# Patient Record
Sex: FEMALE | URBAN - METROPOLITAN AREA
[De-identification: names, ages, dates, MRNs, and addresses within clinical notes are randomized per-mention and may not be internally consistent; named-entity substitution may affect disease eponyms.]

---

## 2021-04-08 DIAGNOSIS — Z23 ENCOUNTER FOR IMMUNIZATION: ICD-10-CM

## 2023-02-10 ENCOUNTER — TELEPHONE (OUTPATIENT)
Dept: DERMATOLOGY | Facility: CLINIC | Age: 63
End: 2023-02-10

## 2023-03-27 ENCOUNTER — OFFICE VISIT (OUTPATIENT)
Dept: DERMATOLOGY | Facility: CLINIC | Age: 63
End: 2023-03-27

## 2023-03-27 VITALS — BODY MASS INDEX: 32 KG/M2 | HEIGHT: 60 IN | WEIGHT: 163 LBS

## 2023-03-27 DIAGNOSIS — L66.9 SCARRING ALOPECIA: Primary | ICD-10-CM

## 2023-03-27 RX ORDER — IBUPROFEN 200 MG
CAPSULE ORAL
COMMUNITY

## 2023-03-27 RX ORDER — MELOXICAM 15 MG/1
15 TABLET ORAL AS NEEDED
COMMUNITY
Start: 2022-12-28

## 2023-03-27 RX ORDER — MEDROXYPROGESTERONE ACETATE 150 MG/ML
INJECTION, SUSPENSION INTRAMUSCULAR
COMMUNITY
Start: 2023-03-07

## 2023-03-27 RX ORDER — FOLIC ACID 1 MG/1
TABLET ORAL
COMMUNITY

## 2023-03-27 RX ORDER — ATORVASTATIN CALCIUM 10 MG/1
TABLET, FILM COATED ORAL
COMMUNITY
Start: 2023-02-11

## 2023-03-27 NOTE — PROGRESS NOTES
"[facility]     Patient Name: Belgica Ambrocio  Encounter Date: 3/27/23     Have you been cared for by a [place] [address] Dermatologist in the last 3 years and, if so, which description applies to you? NO  I am considered a \"new\" patient and must complete all patient intake questions  I am FEMALE/of child-bearing potential     REVIEW OF SYSTEMS:  Have you recently had or currently have any of the following? · Recent fever or chills? No  · Any non-healing wound? No  · Are you pregnant or planning to become pregnant? No  · Are you currently or planning to be nursing or breast feeding? No   PAST MEDICAL HISTORY:  Have you personally ever had or currently have any of the following? If \"YES,\" then please provide more detail  · Skin cancer (such as Melanoma, Basal Cell Carcinoma, Squamous Cell Carcinoma? No  · Tuberculosis, HIV/AIDS, Hepatitis B or C: No  · Systemic Immunosuppression such as Diabetes, Biologic or Immunotherapy, Chemotherapy, Organ Transplantation, Bone Marrow Transplantation YES, on Enbrel for RA  · Radiation Treatment No   FAMILY HISTORY:  Any \"first degree relatives\" (parent, brother, sister, or child) with the following? • Skin Cancer, Pancreatic or Other Cancer? No   PATIENT EXPERIENCE:    • Do you want the Dermatologist to perform a COMPLETE skin exam today including a clinical examination under the \"bra and underwear\" areas? NO  • If necessary, do we have your permission to call and leave a detailed message on your Preferred Phone number that includes your specific medical information?   Yes      Allergies   Allergen Reactions   • Gluten Meal - Food Allergy GI Intolerance   • Penicillins Hives     Other reaction(s): Hives        Current Outpatient Medications:   •  atorvastatin (LIPITOR) 10 mg tablet, ONE AT DINNERTIME MEAL FOR LIPIDS, Disp: , Rfl:   •  calcium carbonate (OS-RJ) 1250 (500 Ca) MG tablet, NO SIG SUPPLIED, Disp: , Rfl:   •  Enbrel SureClick 50 MG/ML injection, , " Disp: , Rfl:   •  folic acid (FOLVITE) 1 mg tablet, Take by mouth, Disp: , Rfl:   •  meloxicam (MOBIC) 15 mg tablet, 15 mg if needed, Disp: , Rfl:   •  methotrexate 2 5 mg tablet, 2 5 mg 3 pills once weekly, Disp: , Rfl:           • Whom besides the patient is providing clinical information about today's encounter?   o NO ADDITIONAL HISTORIAN (patient alone provided history)    Physical Exam and Assessment/Plan by Diagnosis:    SCARRING ALOPECIA     Physical Exam:  • Anatomic Location Affected:  scalp  o Morphological Description:  2 spots with decreased to absent hair density scarring with loss of follicular ostia, perifollicular scale with background erythema  • Pertinent Positives:  • Pertinent Negatives: Additional History of Present Condition:  Patient states the hair loss started about 6 months ago in the front of the scalp  The area can sometimes be painful  Assessment and Plan:  Based on a thorough discussion of this condition and the management approach to it (including a comprehensive discussion of the known risks, side effects and potential benefits of treatment), the patient (family) agrees to implement the following specific plan:    • Biopsy done today  • Consent obtained    What is scarring alopecia? Scarring alopecia, also known as cicatricial alopecia, occurs when the hair follicle is the target of a destructive inflammatory process  Cicatricial alopecia occurs in otherwise healthy men and women of all ages and is seen worldwide  Primary scarring alopecia is due to a diverse group of rare disorders that destroy the hair follicle and replace it with scar tissue causing permanent hair loss  Secondary scarring alopecias are due to destruction of the hair follicle due to external injury such as severe infections, burns, radiation, traction (tight curls), surgery, and other processes     Infections causing scarring alopecia include:  • Bacterial infection: boils and abscesses (Staphylococcus aureus)  • Fungal infection: kerion (inflammatory tinea capitis)  • Viral infection: shingles (herpes zoster)  Inflammatory skin diseases causing scarring alopecia include:  • Folliculitis decalvans  • Dissecting cellulitis  • Lichen planopilaris  • Frontal fibrosing alopecia  • Alopecia mucinosa  • Discoid lupus erythematosus  • Localised scleroderma  Central Centrifugal Cicatricial Alopecia (CCCA) is a form of scarring alopecia on the scalp that results in permanent hair loss  It is the most common form of scarring hair loss seen in black women  However, it may be seen in men and among persons of all races and hair colour (though rarely)  Middle-aged women are most commonly affected  The exact cause of CCCA is unknown and is likely multifactorial  Hair care practices, such as the use of the hot comb, relaxers, tight extensions and weaves, have been implicated for decades, but studies have not shown a consistent link  Other proposed causes are similar to the ones discussed above  What are the signs and symptoms of scarring alopecia? Hair loss can be gradual, without symptoms, and unnoticed for long periods  In other cases, the hair loss may be associated with severe itching, pain and burning, and progress rapidly  Affected areas of the scalp may have redness, scaling, increased or decreased pigmentation, pustules, or draining sinuses  Other cases may show little signs of inflammation  The inflammation that destroys the follicle is below the skin surface and there is usually no “scar” seen on the scalp, although the affected scalp is usually left bare and smooth without hair and usual pore markings  How do we diagnose scarring alopecia? Early diagnosis of scarring alopecia is important because medical intervention can prevent further progression that often results in extensive, permanent hair loss   Diagnosis is based on clinical features, scalp biopsy and exclusion of other hair loss disorders  Your doctor may perform a scalp biopsy by removing a piece of skin taken from an active edge of a patch of alopecia  Examination of the tissue under microscopy reveals inflammatory cells around the infundibulum (base of the hair follicle), and fibrosis (scarring)  Premature peeling of the inner root sheath of the hair follicle is a common finding  How do we treat scarring alopecia? The goal of therapy is to halt progression of disease and prevent further hair loss  In areas where the hair follicle has been replaced with scarring, regrowth is not possible  Treatment depends on the underlying cause of the inflammatory process  • Infections should be treated  • Deficiencies should be remedied  • Causative drugs may be discontinued  • Inflammation can be suppressed  • Treatment may be available for specific conditions  Treatment options for Central Centrifugal Cicatricial Alopecia include anti-inflammatory agents such as:  • Potent topical steroids (eg clobetasol) or intralesional steroids  • Calcineurin inhibitors: tacrolimus ointment, pimecrolimus cream  • Tetracyclines (eg doxycycline 100 mg twice daily, taken for several weeks to months)  • Hydroxychloroquine  • Ciclosporin  Hair transplantation can be considered in individuals with well-controlled CCCA for at least one year  However, graft survival is low  Discontinuation of traumatic hair care practices is an essential aspect of treatment of CCCA  Women with CCCA are encouraged to consider natural hairstyles  • Relaxers should be performed by a professional, no more frequently than every 6-8 weeks  The scalp should not burn as a result of relaxer application    • Minimize heat application (hooded dryers, blow dryers, hot martin and flat irons)  • Avoid tight graciela and weaves/extensions   • Avoid hair style practices associated with discomfort, scalp irritation or scale  • In Black women, shampooing every 1-2 weeks may prevent excessive dryness    Minoxidil solution may help stimulate growth in hair follicles  Seborrheic dermatitis should be treated with appropriate medicated shampoos and topical anti-inflammatory agents as needed  PROCEDURE NOTE:  PUNCH BIOPSY      Performing Physician: Dr Andrew    Anatomic Location; Clinical Description with size (cm); Pre-Op Diagnosis:   o  Specimen A Scalp H&E; skin; punch; 2 spots with decreased to absent hair density scaring with loss of follicular ostea  perifollicular scale with background erythema       Anesthesia: 1% xylocaine with epi       Topical anesthesia: None       Performing Physician: Dr Andrew    Anatomic Location; Clinical Description with size (cm); Pre-Op Diagnosis:   o  Specimen B Scalp DIFF; skin; punch; 2 spots with decreased to absent hair density scaring with loss of follicular ostea perifollicular scale with background erythema       Anesthesia: 1% xylocaine with epi       Topical anesthesia: None          Indications: To indicate diagnosis and management plan  Procedure Details     Patient informed of the risks (including bleeding,scaring and infection) and benefits of the procedure explained  Verbal and written informed consent obtained  The area was prepped and draped in the usual fashion  Anesthesia was obtained with 1% lidocaine with epinephrine  The skin was then stretched perpendicular to the skin tension lines and a punch biopsy to an appropriate sampling depth was obtained with a 4 mm punch with a forceps and iris scissors  Hemostasis was obtained with 4-0 Prolene   Complications:  None      Specimen has been sent for review by Dermatopathology  Plan:  1  Instructed to keep the wound dry and covered for 24-48h and clean thereafter  2  Warning signs of infection were reviewed  3  Recommended that the patient use acetaminophen as needed for pain  4   Sutures if any should be removed in 14 days      Standard post-procedure care has been explained and has been included in written form within the patient's copy of Informed Consent        Scribe Attestation    I,:  Sonya Collier MA am acting as a scribe while in the presence of the attending physician :       I,:  Raegan Fischer MD personally performed the services described in this documentation    as scribed in my presence :

## 2023-03-30 DIAGNOSIS — Z79.899 HIGH RISK MEDICATION USE: ICD-10-CM

## 2023-03-30 DIAGNOSIS — L93.0 DISCOID LUPUS: Primary | ICD-10-CM

## 2023-03-30 NOTE — RESULT ENCOUNTER NOTE
DERMATOPATHOLOGY RESULT NOTE    Results reviewed by ordering physician  Called patient to personally discuss results  Discussed results with patient  Instructions for Clinical Derm Team:   (remember to route Result Note to appropriate staff): Can we please add patient onto my schedule on April 10th as an ovrbook for 810 AM? This is the day of her suture removal which is happening at [address] [place] [address]  She needs to have some quick intralesional kenalog injections  Result & Plan by Specimen:    Specimen A: benign  Plan:     - Patient to be scheduled for [address] [place] [address] for April 10, 2023   - Labs ordered for baseline plaquenil screening  - Ophthalmology referral placed for plaquenil initiation  - Patient to inform rheumatologist of results; however patient is under close rheumatology care due to history of rheumatologic disease and expressed low concern for SLE as this is present in only a small subset of patients with DLE  - Will order plaquenil upon resulting labs  Confirmed no interaction with current medications  Case Report  Surgical Pathology Report                         Case: [id number]                                   Authorizing Provider: Ardia Merlin, MD Collected:           03/27/2023 [id number]              Ordering Location:     [facility]      Received:            03/27/2023 [address] [place] [address]                                                                Pathologist:           Mart Potter MD                                                           Specimens:   A) - Skin, Other, Specimen A Anterior Scalp H&E                                                     B) - Skin, Other, Specimen B Posterior Scalp DIFF                                        Final Diagnosis  A   Skin, anterior scalp, punch biopsy:    Vacuolar interface dermatitis with superficial to deep perivascular/periadnexal lymphocytic infiltrate (see note)      Note: In the appropriate clinical context, the histopathologic findings are compatible with a connective tissue disease, particularly discoid lupus erythematosus  Clinical pathologic correlation is advised  Multiple levels examined  PAS and colloidal iron stains were reviewed            B  Skin, posterior scalp, punch biopsy:     Specimen entirely sent to [facility] for immunofluorescence analysis  A separate report will be issued by [facility] following completion of their studies

## 2023-04-07 LAB — MISCELLANEOUS LAB TEST RESULT: NORMAL

## 2023-05-15 ENCOUNTER — OFFICE VISIT (OUTPATIENT)
Dept: DERMATOLOGY | Facility: CLINIC | Age: 63
End: 2023-05-15

## 2023-05-15 VITALS — HEIGHT: 60 IN | WEIGHT: 153 LBS | TEMPERATURE: 98.8 F | BODY MASS INDEX: 30.04 KG/M2

## 2023-05-15 DIAGNOSIS — Z79.899 HIGH RISK MEDICATION USE: ICD-10-CM

## 2023-05-15 DIAGNOSIS — L93.0 DISCOID LUPUS: Primary | ICD-10-CM

## 2023-05-15 NOTE — PROGRESS NOTES
"[facility]     Patient Name: Milan Awan  Encounter Date: 05/15/23     Have you been cared for by a [facility] in the last 3 years and, if so, which description applies to you? Yes  I have been here within the last 3 years, and my medical history has NOT changed since that time  I am FEMALE/of child-bearing potential     REVIEW OF SYSTEMS:  Have you recently had or currently have any of the following? · No changes in my recent health  PAST MEDICAL HISTORY:  Have you personally ever had or currently have any of the following? If \"YES,\" then please provide more detail  · No changes in my medical history  FAMILY HISTORY:  Any \"first degree relatives\" (parent, brother, sister, or child) with the following? • No changes in my family's known health  PATIENT EXPERIENCE:    • Do you want the Dermatologist to perform a COMPLETE skin exam today including a clinical examination under the \"bra and underwear\" areas? NO  • If necessary, do we have your permission to call and leave a detailed message on your Preferred Phone number that includes your specific medical information?   Yes      Allergies   Allergen Reactions   • Gluten Meal - Food Allergy GI Intolerance   • Penicillins Hives     Other reaction(s): Hives        Current Outpatient Medications:   •  atorvastatin (LIPITOR) 10 mg tablet, ONE AT DINNERTIME MEAL FOR LIPIDS, Disp: , Rfl:   •  calcium carbonate (OS-RJ) 1250 (500 Ca) MG tablet, NO SIG SUPPLIED, Disp: , Rfl:   •  Enbrel SureClick 50 MG/ML injection, , Disp: , Rfl:   •  folic acid (FOLVITE) 1 mg tablet, Take by mouth, Disp: , Rfl:   •  hydroxychloroquine (PLAQUENIL) 200 mg tablet, Alternate one pill (200 mg) and two pills (400 mg); example schedule M - 1 pill, T - 2 pills, W - 1 pill, Th - 2 pills , etc , Disp: 45 tablet, Rfl: 3  •  meloxicam (MOBIC) 15 mg tablet, 15 mg if needed, Disp: , Rfl:   •  methotrexate 2 5 mg tablet, 2 5 mg 3 pills once weekly, Disp: , " Rfl:           • Whom besides the patient is providing clinical information about today's encounter?   o NO ADDITIONAL HISTORIAN (patient alone provided history)    Physical Exam and Assessment/Plan by Diagnosis:    Discoid Lupus with history of RA    Physical Exam:  • Anatomic Location Affected:  scalp  • Morphological Description:   2 spots with decreased to absent hair density scarring with loss of follicular ostia, perifollicular scale with background erythema  Posterior lesion appears stable to improved with decreased erythema and no increase in scarring/loss of follicular ostia  • Pertinent Positives:  • Pertinent Negatives: No additional areas of involvement on scalp or face      Additional History of Present Condition: Patient is here for follow up lupus from office visit 4/10/23  Patient states where they last had her biopsy is still mildly tender to touch  Patient is taking the Plaquenil as directed  It is hard for patient to know if she is seeing any improvement         Assessment and Plan:  Based on a thorough discussion of this condition and the management approach to it (including a comprehensive discussion of the known risks, side effects and potential benefits of treatment), the patient (family) agrees to implement the following specific plan:  • Discussed and Continue taking Plaquenil 200 mg alternating every other day with 400 mg to provide an effective dose of 300 mg daily  Baseline labs completed and non-concerning  Re-educated on side effects of plaquenil and need for baseline ophthalmology evaluation (referral placed and patient actively working to make appointment)  Educated that we will need to order monitoring labs today (CBC and CMP) and then again at month 6   • The nature of sun-induced photo-aging and skin cancers is discussed  Sun avoidance, protective clothing, and the use of 30-SPF sunscreens is advised  Observe closely for skin damage/changes, and call if such occurs    • Use a "moisturizer + sunscreen \"combo\" product such as Neutrogena Daily Defense SPF 50+ or CeraVe AM at least three times a day  • Kenalog 5 mg injection administered in office with signed consent today  Will plan to repeat in 4-6 weeks  • Follow up 4-6 weeks        PROCEDURE:  INTRALESIONAL STEROID INJECTION (KENALOG INJECTION)     Purpose: Triamcinolone is a synthetic glucocorticoid corticosteroid that has marked anti-inflammatory action  It is prepared in sterile aqueous suspension suitable for injecting directly into a lesion on or immediately below the skin to treat a dermal inflammatory process       Indications: It is indicated for alopecia areata; inflammatory acne cysts; discoid lupus erythematosus; keloids and hypertrophic scars; inflammatory lesions of granuloma annulare, lichen planus, lichen simplex chronicus (neurodermatitis), psoriatic plaques, and other localized inflammatory skin conditions       Potential Side Effects: I understand that triamcinolone injection can potentially cause early and/or delayed adverse effects such as:   • Pain   • Impaired wound healing   • Increased hair growth   • Bleeding   • White or brown marks   • Steroid acne   • Infection   • Telangiectasia   • Skin thinning   • Cutaneous and subcutaneous lipoatrophy (most common) appearing as skin indentations or dimples around the injection sites a few weeks after treatment      PROCEDURE NOTE:  After verbal and written consent were obtained, the to-be-treated area was wiped and cleaned with rubbing alcohol 70%     Then, a total of 1 mL of Kenalog CONCENTRATION:  10 mg/mL   (Lot# [id number]; Expiration 08/2024, NDC#: 7150-9439-93) diluted to a final concentration of  5 mg/mL was injected intralesionally into a total of 1 lesion/s on the following anatomic areas:  scalp using a 3-mL syringe and a 30-gauge needle        There was less than 1 mL of blood loss and little to no discomfort  The area was bandaged with a Band-aid    The " patient tolerated the procedure well and remained in the office for observation    With no signs of an adverse reaction, the patient was eventually discharged from clinic              Scribe Attestation    I,:  Chana Doherty am acting as a scribe while in the presence of the attending physician :       I,:  Chelita Maharaj MD personally performed the services described in this documentation    as scribed in my presence :

## 2023-06-26 ENCOUNTER — OFFICE VISIT (OUTPATIENT)
Dept: DERMATOLOGY | Facility: CLINIC | Age: 63
End: 2023-06-26
Payer: COMMERCIAL

## 2023-06-26 VITALS — WEIGHT: 150 LBS | HEIGHT: 60 IN | BODY MASS INDEX: 29.45 KG/M2

## 2023-06-26 DIAGNOSIS — L93.0 DISCOID LUPUS: Primary | ICD-10-CM

## 2023-06-26 PROCEDURE — 11900 INJECT SKIN LESIONS </W 7: CPT | Performed by: STUDENT IN AN ORGANIZED HEALTH CARE EDUCATION/TRAINING PROGRAM

## 2023-06-26 NOTE — PROGRESS NOTES
"[facility]     Patient Name: Paula Hinds  Encounter Date: 06/26/2023     Have you been cared for by a [place] [id number] Dermatologist in the last 3 years and, if so, which description applies to you? Yes  I have been here within the last 3 years, and my medical history has NOT changed since that time  I am FEMALE/of child-bearing potential     REVIEW OF SYSTEMS:  Have you recently had or currently have any of the following? · No changes in my recent health  PAST MEDICAL HISTORY:  Have you personally ever had or currently have any of the following? If \"YES,\" then please provide more detail  · No changes in my medical history  FAMILY HISTORY:  Any \"first degree relatives\" (parent, brother, sister, or child) with the following? • No changes in my family's known health  PATIENT EXPERIENCE:    • Do you want the Dermatologist to perform a COMPLETE skin exam today including a clinical examination under the \"bra and underwear\" areas? NO  • If necessary, do we have your permission to call and leave a detailed message on your Preferred Phone number that includes your specific medical information?   Yes      Allergies   Allergen Reactions   • Gluten Meal - Food Allergy GI Intolerance   • Penicillins Hives     Other reaction(s): Hives        Current Outpatient Medications:   •  atorvastatin (LIPITOR) 10 mg tablet, ONE AT DINNERTIME MEAL FOR LIPIDS, Disp: , Rfl:   •  calcium carbonate (OS-RJ) 1250 (500 Ca) MG tablet, NO SIG SUPPLIED, Disp: , Rfl:   •  Enbrel SureClick 50 MG/ML injection, , Disp: , Rfl:   •  folic acid (FOLVITE) 1 mg tablet, Take by mouth, Disp: , Rfl:   •  hydroxychloroquine (PLAQUENIL) 200 mg tablet, Alternate one pill (200 mg) and two pills (400 mg); example schedule M - 1 pill, T - 2 pills, W - 1 pill, Th - 2 pills , etc , Disp: 45 tablet, Rfl: 3  •  meloxicam (MOBIC) 15 mg tablet, 15 mg if needed, Disp: , Rfl:   •  methotrexate 2 5 mg tablet, 2 5 mg 3 pills once weekly, Disp: " , Rfl:           • Whom besides the patient is providing clinical information about today's encounter?   o NO ADDITIONAL HISTORIAN (patient alone provided history)    Physical Exam and Assessment/Plan by Diagnosis:    DISCOID LUPUS WITH HX OF RA  Physical Exam:  • Anatomic Location Affected:  Scalp  • Morphological Description:  2 spots with decreased to absent hair density scarring with loss of follicular ostia, perifollicular scale with background erythema  Posterior lesion appears stable to improved with decreased erythema and no increase in scarring/loss of follicular ostia  • Pertinent Positives:  • Pertinent Negatives: Additional History of Present Condition:  Pt presents with thinning hair due to her Lupus  Pt had both sites injected with kenalog at the last visit  Pt is still taking the plaquenil  She notes decreased hair loss  Assessment and Plan:  Based on a thorough discussion of this condition and the management approach to it (including a comprehensive discussion of the known risks, side effects and potential benefits of treatment), the patient (family) agrees to implement the following specific plan:  • Discussed and Continue taking Plaquenil 200 mg alternating every other day with 400 mg to provide an effective dose of 300 mg daily   Baseline labs  And 1 mos labs completed and non-concerning   Re-educated on side effects of plaquenil  Educated that we will need to order labs in November  • Kenalog-5 injected today; Consent obtained  • Monitor for any changes      PROCEDURE:  INTRALESIONAL STEROID INJECTION (KENALOG INJECTION)    Purpose: Triamcinolone is a synthetic glucocorticoid corticosteroid that has marked anti-inflammatory action  It is prepared in sterile aqueous suspension suitable for injecting directly into a lesion on or immediately below the skin to treat a dermal inflammatory process  Indications:  It is indicated for alopecia areata; inflammatory acne cysts; discoid lupus erythematosus; keloids and hypertrophic scars; inflammatory lesions of granuloma annulare, lichen planus, lichen simplex chronicus (neurodermatitis), psoriatic plaques, and other localized inflammatory skin conditions  Potential Side Effects: I understand that triamcinolone injection can potentially cause early and/or delayed adverse effects such as:   • Pain   • Impaired wound healing   • Increased hair growth   • Bleeding   • White or brown marks   • Steroid acne   • Infection   • Telangiectasia   • Skin thinning   • Cutaneous and subcutaneous lipoatrophy (most common) appearing as skin indentations or dimples around the injection sites a few weeks after treatment     PROCEDURE NOTE:  After verbal and written consent were obtained, the to-be-treated area was wiped and cleaned with rubbing alcohol 70%  Then, a total of 0 5 mL of Kenalog CONCENTRATION:  10 mg/mL; diluted to 5mg   (Lot# [id number]; Expiration 7/24/2023, NDC#: 3948-0776-03) was injected intralesionally into a total of 1 lesion/s on the following anatomic areas:  Frontal scalp using a 1-mL syringe and a 30-gauge needle  There was less than 1 mL of blood loss and little to no discomfort  The area was bandaged with a Band-aid  The patient tolerated the procedure well and remained in the office for observation  With no signs of an adverse reaction, the patient was eventually discharged from clinic        Scribe Attestation    I,:  Lubna Bailey am acting as a scribe while in the presence of the attending physician :       I,:  Brandy Erwin MD personally performed the services described in this documentation    as scribed in my presence :

## 2023-06-26 NOTE — PATIENT INSTRUCTIONS
Assessment and Plan:  Based on a thorough discussion of this condition and the management approach to it (including a comprehensive discussion of the known risks, side effects and potential benefits of treatment), the patient (family) agrees to implement the following specific plan:  Discussed and Continue taking Plaquenil 200 mg alternating every other day with 400 mg to provide an effective dose of 300 mg daily  Baseline labs completed and non-concerning  Re-educated on side effects of plaquenil  Educated that we will need to order labs in 6 months  Kenalog-5 injected today;  Consent obtained  Monitor for any changes

## 2023-07-24 ENCOUNTER — OFFICE VISIT (OUTPATIENT)
Dept: DERMATOLOGY | Facility: CLINIC | Age: 63
End: 2023-07-24
Payer: COMMERCIAL

## 2023-07-24 ENCOUNTER — TELEPHONE (OUTPATIENT)
Dept: DERMATOLOGY | Facility: CLINIC | Age: 63
End: 2023-07-24

## 2023-07-24 VITALS — WEIGHT: 149.8 LBS | TEMPERATURE: 98.7 F | HEIGHT: 60 IN | BODY MASS INDEX: 29.41 KG/M2

## 2023-07-24 DIAGNOSIS — L93.0 DISCOID LUPUS: Primary | ICD-10-CM

## 2023-07-24 PROCEDURE — 11900 INJECT SKIN LESIONS </W 7: CPT | Performed by: DERMATOLOGY

## 2023-07-24 NOTE — TELEPHONE ENCOUNTER
Patient was treated today with Kenalog injection. Patient states she is doing well, notes she has noticed less hair loss. Patient still taking plaquenil. Patient wondering if today should be her last treatment. Or should she still continue with injections? Patient advised that I would send Dr. Elsi Mendosa a message for clarification.

## 2023-07-24 NOTE — PROGRESS NOTES
[facility]     Patient Name: Rubén Catalan  Encounter Date: 7/24/23     Have you been cared for by a [facility] in the last 3 years and, if so, which description applies to you? Yes. I have been here within the last 3 years, and my medical history has NOT changed since that time. I am FEMALE/of child-bearing potential.    REVIEW OF SYSTEMS:  Have you recently had or currently have any of the following? · No changes in my recent health. PAST MEDICAL HISTORY:  Have you personally ever had or currently have any of the following? If "YES," then please provide more detail. · No changes in my medical history. FAMILY HISTORY:  Any "first degree relatives" (parent, brother, sister, or child) with the following? • No changes in my family's known health. PATIENT EXPERIENCE:    • Do you want the Dermatologist to perform a COMPLETE skin exam today including a clinical examination under the "bra and underwear" areas? NO  • If necessary, do we have your permission to call and leave a detailed message on your Preferred Phone number that includes your specific medical information?   Yes      Allergies   Allergen Reactions   • Gluten Meal - Food Allergy GI Intolerance   • Penicillins Hives     Other reaction(s): Hives        Current Outpatient Medications:   •  atorvastatin (LIPITOR) 10 mg tablet, ONE AT DINNERTIME MEAL FOR LIPIDS, Disp: , Rfl:   •  calcium carbonate (OS-RJ) 1250 (500 Ca) MG tablet, NO SIG SUPPLIED, Disp: , Rfl:   •  Enbrel SureClick 50 MG/ML injection, , Disp: , Rfl:   •  folic acid (FOLVITE) 1 mg tablet, Take by mouth, Disp: , Rfl:   •  hydroxychloroquine (PLAQUENIL) 200 mg tablet, Alternate one pill (200 mg) and two pills (400 mg); example schedule M - 1 pill, T - 2 pills, W - 1 pill, Th - 2 pills , etc., Disp: 45 tablet, Rfl: 3  •  meloxicam (MOBIC) 15 mg tablet, 15 mg if needed, Disp: , Rfl:   •  methotrexate 2.5 mg tablet, 2.5 mg 3 pills once weekly, Disp: , Rfl:           • Whom besides the patient is providing clinical information about today's encounter?   o NO ADDITIONAL HISTORIAN (patient alone provided history)    Physical Exam and Assessment/Plan by Diagnosis:    DISCOID LUPUS WITH HX OF RA  Physical Exam:  • Anatomic Location Affected:  Scalp  • Morphological Description:  One small focus with decreased to absent hair density scarring with loss of follicular ostia, perifollicular scale with background erythema. Posterior lesion appears stable to improved with decreased erythema and no increase in scarring/loss of follicular ostia. • Pertinent Positives:  • Pertinent Negatives: Additional History of Present Condition:  Pt presents with thinning hair due to her discoid lupus erythematosus. Pt had both sites injected with kenalog at the last visit. Pt is still taking the plaquenil. She notes decreased hair loss. Assessment and Plan:  Based on a thorough discussion of this condition and the management approach to it (including a comprehensive discussion of the known risks, side effects and potential benefits of treatment), the patient (family) agrees to implement the following specific plan:  • Kenalog -10 treatment in office - signed consent obtained    PROCEDURE:  INTRALESIONAL STEROID (KENALOG INJECTION) AND EFUDEX INJECTION     Purpose: Triamcinolone is a synthetic glucocorticoid corticosteroid that has marked anti-inflammatory action. It is prepared in sterile aqueous suspension suitable for injecting directly into a lesion on or immediately below the skin to treat a dermal inflammatory process. Indications: It is indicated for alopecia areata; inflammatory acne cysts; discoid lupus erythematosus; keloids and hypertrophic scars; inflammatory lesions of granuloma annulare, lichen planus, lichen simplex chronicus (neurodermatitis), psoriatic plaques, and other localized inflammatory skin conditions.       Potential Side Effects: I understand that triamcinolone injection can potentially cause early and/or delayed adverse effects such as:   • Pain   • Impaired wound healing   • Increased hair growth   • Bleeding   • White or brown marks   • Steroid acne   • Infection   • Telangiectasia   • Skin thinning   • Cutaneous and subcutaneous lipoatrophy (most common) appearing as skin indentations or dimples around the injection sites a few weeks after treatment      PROCEDURE NOTE:  After verbal and written consent were obtained, the to-be-treated area was wiped and cleaned with rubbing alcohol 70%. Then, a total of 0.5 mL of Kenalog CONCENTRATION:  10 mg/mL   (Lot# [id number]; Expiration 09/2024, NDC#: 3692-0384-20) was injected intralesionally into a total of scalp lesion/s on the following anatomic areas:  right medial frontal parietal scalp using a 1-mL syringe and a 30-gauge needle. There was less than 1 mL of blood loss and little to no discomfort. The area was bandaged with a Band-aid. The patient tolerated the procedure well and remained in the office for observation. With no signs of an adverse reaction, the patient was eventually discharged from clinic.         Scribe Attestation    I,:  Darlene Mahoney am acting as a scribe while in the presence of the attending physician.:       I,:  Vallarie Gilford, MD personally performed the services described in this documentation    as scribed in my presence.:

## 2023-08-22 DIAGNOSIS — L93.0 DISCOID LUPUS: ICD-10-CM

## 2023-08-23 RX ORDER — HYDROXYCHLOROQUINE SULFATE 200 MG/1
TABLET, FILM COATED ORAL
Qty: 45 TABLET | Refills: 3 | Status: SHIPPED | OUTPATIENT
Start: 2023-08-23 | End: 2023-11-23

## 2024-02-16 DIAGNOSIS — L93.0 DISCOID LUPUS: ICD-10-CM

## 2024-02-19 RX ORDER — HYDROXYCHLOROQUINE SULFATE 200 MG/1
TABLET, FILM COATED ORAL
Qty: 45 TABLET | Refills: 0 | Status: SHIPPED | OUTPATIENT
Start: 2024-02-19 | End: 2024-05-18

## 2024-05-07 ENCOUNTER — PATIENT MESSAGE (OUTPATIENT)
Dept: DERMATOLOGY | Facility: CLINIC | Age: 64
End: 2024-05-07

## 2024-05-14 DIAGNOSIS — L93.0 DISCOID LUPUS: ICD-10-CM

## 2024-05-15 RX ORDER — HYDROXYCHLOROQUINE SULFATE 200 MG/1
TABLET, FILM COATED ORAL
Qty: 45 TABLET | Refills: 5 | Status: SHIPPED | OUTPATIENT
Start: 2024-05-15 | End: 2024-08-11

## 2024-05-31 DIAGNOSIS — Z79.899 HIGH RISK MEDICATION USE: Primary | ICD-10-CM

## 2024-05-31 NOTE — TELEPHONE ENCOUNTER
HI All,   Can we get Keri in for an appointment? OK for parallel [facility] clinic with me (dianne until end of June and bartolome from July onward on Wednesdays).   Thanks!  Jarad

## 2024-06-17 NOTE — PATIENT COMMUNICATION
Rec'd call from patient to schedule follow up appointment.    Scheduled for this Wednesday, 6/19/2024 @ 8:00 am in [place] with Jasmin & Dr. Andrew.    Patient aware of office location.   Stable

## 2024-06-19 ENCOUNTER — OFFICE VISIT (OUTPATIENT)
Dept: DERMATOLOGY | Facility: CLINIC | Age: 64
End: 2024-06-19
Payer: COMMERCIAL

## 2024-06-19 DIAGNOSIS — Z79.899 HIGH RISK MEDICATION USE: Primary | ICD-10-CM

## 2024-06-19 DIAGNOSIS — L93.0 DISCOID LUPUS: Primary | ICD-10-CM

## 2024-06-19 PROCEDURE — 11900 INJECT SKIN LESIONS </W 7: CPT

## 2024-06-19 PROCEDURE — 99214 OFFICE O/P EST MOD 30 MIN: CPT

## 2024-06-19 RX ORDER — HYDROXYCHLOROQUINE SULFATE 200 MG/1
TABLET, FILM COATED ORAL
Qty: 135 TABLET | Refills: 1 | Status: SHIPPED | OUTPATIENT
Start: 2024-06-19 | End: 2024-09-15

## 2024-06-19 NOTE — PROGRESS NOTES
"[facility] Note     Patient Name: Keri Garcia  Encounter Date: 06/19/2024     Have you been cared for by a [facility] Dermatologist in the last 3 years and, if so, which description applies to you?    Yes.  I have been here within the last 3 years, and my medical history has NOT changed since that time.  I am FEMALE/of child-bearing potential.    REVIEW OF SYSTEMS:  Have you recently had or currently have any of the following? No changes in my recent health.   PAST MEDICAL HISTORY:  Have you personally ever had or currently have any of the following?  If \"YES,\" then please provide more detail. No changes in my medical history.   HISTORY OF IMMUNOSUPPRESSION: Do you have a history of any of the following:  Systemic Immunosuppression such as Diabetes, Biologic or Immunotherapy, Chemotherapy, Organ Transplantation, Bone Marrow Transplantation?  No     Answering \"YES\" requires the addition of the dotphrase \"IMMUNOSUPPRESSED\" as the first diagnosis of the patient's visit.   FAMILY HISTORY:  Any \"first degree relatives\" (parent, brother, sister, or child) with the following?    No changes in my family's known health.   PATIENT EXPERIENCE:    Do you want the Dermatologist to perform a COMPLETE skin exam today including a clinical examination under the \"bra and underwear\" areas?  NO  If necessary, do we have your permission to call and leave a detailed message on your Preferred Phone number that includes your specific medical information?  Yes      Allergies   Allergen Reactions    Gluten Meal - Food Allergy GI Intolerance    Penicillins Hives     Other reaction(s): Hives        Current Outpatient Medications:     atorvastatin (LIPITOR) 10 mg tablet, ONE AT DINNERTIME MEAL FOR LIPIDS, Disp: , Rfl:     calcium carbonate (OS-RJ) 1250 (500 Ca) MG tablet, NO SIG SUPPLIED, Disp: , Rfl:     Enbrel SureClick 50 MG/ML injection, , Disp: , Rfl:     folic acid (FOLVITE) 1 mg tablet, Take by mouth, Disp: , Rfl:     " hydroxychloroquine (PLAQUENIL) 200 mg tablet, Alternate one pill (200 mg) and two pills (400 mg); example schedule M - 1 pill, T - 2 pills, W - 1 pill, Th - 2 pills , etc., Disp: 45 tablet, Rfl: 5    meloxicam (MOBIC) 15 mg tablet, 15 mg if needed, Disp: , Rfl:     methotrexate 2.5 mg tablet, 2.5 mg 3 pills once weekly, Disp: , Rfl:           Whom besides the patient is providing clinical information about today's encounter?   NO ADDITIONAL HISTORIAN (patient alone provided history)    Physical Exam and Assessment/Plan by Diagnosis:    DISCOID LUPUS WITH HX OF RA   Physical Exam:  Anatomic Location Affected:  scalp  Morphological Description:  isolated patch of scarring with loss if follicular ostia on left frontal scalp, erythematous plaque with atrophy and hair loss on central frontal scalp, no lesions on face       Additional History of Present Condition:  Patient presents as a follow-up for discoid lupus with hair thinning. Patient was last seen in July 2023 by Dr. Bernal and treated with intralesional Kenalog. Patient reports she no longer had refills on her Plaquenil and reached out to Dr. Andrew recently who provided refills and ordered a CBC and CMP to be completed. Patient did not get those labs done as she had labs recently at [facility] on 5/11/2024 which were within normal limits. Patient has not started taking the Plaquenil again as her pharmacy required a 3 month supply and she wanted to discuss with Dr. Andrew. Patient reports the Plaquenil helped stop her hair loss when she was on it in the past. She states her hair is shedding. She feels she may have a spot in the scalp that was tender a few days ago. She denies any spots on the face. She denies smoking. She has been seeing ophthalmology every year for monitoring for retinol toxicity while on Plaquenil.    Assessment and Plan:  Based on a thorough discussion of this condition and the management approach to it (including a comprehensive discussion  of the known risks, side effects and potential benefits of treatment), the patient (family) agrees to implement the following specific plan:  Kenalog 5mg/mL injection administered to active lesion on central frontal scalp today. Consent obtained. See procedure below.   Restart Plaquenil 200 mg alternating every other day with 400 mg- re-ordered as 90 day supply for male order pharmacy.   CBC and CMP every 3-6 months- labs ordered   Continue to see ophthalmology yearly for monitoring for retinal toxicity.   Follow up in 4-6 weeks to recheck lesion on scalp.     PROCEDURE:  INTRALESIONAL STEROID INJECTION (KENALOG INJECTION)    Purpose: Triamcinolone is a synthetic glucocorticoid corticosteroid that has marked anti-inflammatory action. It is prepared in sterile aqueous suspension suitable for injecting directly into a lesion on or immediately below the skin to treat a dermal inflammatory process.     Indications: It is indicated for alopecia areata; inflammatory acne cysts; discoid lupus erythematosus; keloids and hypertrophic scars; inflammatory lesions of granuloma annulare, lichen planus, lichen simplex chronicus (neurodermatitis), psoriatic plaques, and other localized inflammatory skin conditions.     Potential Side Effects: I understand that triamcinolone injection can potentially cause early and/or delayed adverse effects such as:    Pain    Impaired wound healing    Increased hair growth    Bleeding    White or brown marks    Steroid acne    Infection    Telangiectasia    Skin thinning    Cutaneous and subcutaneous lipoatrophy (most common) appearing as skin indentations or dimples around the injection sites a few weeks after treatment     PROCEDURE NOTE:  After verbal and written consent were obtained, the to-be-treated area was wiped and cleaned with rubbing alcohol 70%.      Then, a total of 0.4 mL of Kenalog CONCENTRATION:  10 mg/mL diluted to 5 mg/mL with normal saline   (Lot# [id number]; Expiration Jan  2026, NDC#: 4033-5321-95) was injected intralesionally into a total of 1 lesion/s on the following anatomic areas: Central frontal scalp using a 1-mL syringe and a 30-gauge needle.      There was less than 1 mL of blood loss and little to no discomfort.  The area was bandaged with a Band-aid.  The patient tolerated the procedure well and remained in the office for observation.  With no signs of an adverse reaction, the patient was eventually discharged from clinic.         Scribe Attestation      I,:  Aracelis Harris am acting as a scribe while in the presence of the attending physician.:       I,:  Jasmin Kent PA-C personally performed the services described in this documentation    as scribed in my presence.:

## 2024-06-21 ENCOUNTER — TELEPHONE (OUTPATIENT)
Dept: DERMATOLOGY | Facility: CLINIC | Age: 64
End: 2024-06-21

## 2024-06-21 DIAGNOSIS — L93.0 DISCOID LUPUS: ICD-10-CM

## 2024-06-21 DIAGNOSIS — Z79.899 HIGH RISK MEDICATION USE: Primary | ICD-10-CM

## 2024-06-21 NOTE — TELEPHONE ENCOUNTER
Called patient following discussion with Dr. Andrew,     advised will need to repeat labs after restarting Plaquenil and being on medication for 1 month. Labs ordered. Further lab monitoring every 3-6 months. Further recommend annual ophthalmology exams with first exam within 6 months of start. Patient already has appointment scheduled.     Jasmin Kent PA-C

## 2024-08-07 ENCOUNTER — OFFICE VISIT (OUTPATIENT)
Dept: DERMATOLOGY | Facility: CLINIC | Age: 64
End: 2024-08-07
Payer: COMMERCIAL

## 2024-08-07 VITALS — BODY MASS INDEX: 28.32 KG/M2 | TEMPERATURE: 97.7 F | WEIGHT: 145 LBS

## 2024-08-07 DIAGNOSIS — Z79.899 HIGH RISK MEDICATION USE: Primary | ICD-10-CM

## 2024-08-07 DIAGNOSIS — L93.0 DISCOID LUPUS: ICD-10-CM

## 2024-08-07 DIAGNOSIS — L66.9 SCARRING ALOPECIA: ICD-10-CM

## 2024-08-07 PROCEDURE — 99213 OFFICE O/P EST LOW 20 MIN: CPT | Performed by: STUDENT IN AN ORGANIZED HEALTH CARE EDUCATION/TRAINING PROGRAM

## 2024-08-07 NOTE — PROGRESS NOTES
"[facility]     Patient Name: Keri Garcia  Encounter Date: 8/7/2024    Have you been cared for by a [facility]atologist in the last 3 years and, if so, which description applies to you?    Yes.  I have been here within the last 3 years, and my medical history has NOT changed since that time.  I am FEMALE/of child-bearing potential.    REVIEW OF SYSTEMS:  Have you recently had or currently have any of the following? No changes in my recent health.   PAST MEDICAL HISTORY:  Have you personally ever had or currently have any of the following?  If \"YES,\" then please provide more detail. No changes in my medical history.   HISTORY OF IMMUNOSUPPRESSION: Do you have a history of any of the following:  Systemic Immunosuppression such as Diabetes, Biologic or Immunotherapy, Chemotherapy, Organ Transplantation, Bone Marrow Transplantation?  Enbrel     Answering \"YES\" requires the addition of the dotphrase \"IMMUNOSUPPRESSED\" as the first diagnosis of the patient's visit.   FAMILY HISTORY:  Any \"first degree relatives\" (parent, brother, sister, or child) with the following?    No changes in my family's known health.   PATIENT EXPERIENCE:    Do you want the Dermatologist to perform a COMPLETE skin exam today including a clinical examination under the \"bra and underwear\" areas?  NO  If necessary, do we have your permission to call and leave a detailed message on your Preferred Phone number that includes your specific medical information?  Yes      Allergies   Allergen Reactions    Gluten Meal - Food Allergy GI Intolerance    Penicillins Hives     Other reaction(s): Hives        Current Outpatient Medications:     atorvastatin (LIPITOR) 10 mg tablet, ONE AT DINNERTIME MEAL FOR LIPIDS, Disp: , Rfl:     calcium carbonate (OS-RJ) 1250 (500 Ca) MG tablet, NO SIG SUPPLIED, Disp: , Rfl:     Enbrel SureClick 50 MG/ML injection, , Disp: , Rfl:     folic acid (FOLVITE) 1 mg tablet, Take by mouth, Disp: , Rfl:    "  hydroxychloroquine (PLAQUENIL) 200 mg tablet, Alternate one pill (200 mg) and two pills (400 mg); example schedule M - 1 pill, T - 2 pills, W - 1 pill, Th - 2 pills , etc., Disp: 135 tablet, Rfl: 1    meloxicam (MOBIC) 15 mg tablet, 15 mg if needed, Disp: , Rfl:     methotrexate 2.5 mg tablet, 2.5 mg 3 pills once weekly, Disp: , Rfl:         Whom besides the patient is providing clinical information about today's encounter?   NO ADDITIONAL HISTORIAN (patient alone provided history)    Physical Exam and Assessment/Plan by Diagnosis:    DISCOID LUPUS WITH HX OF RA   Physical Exam:  Anatomic Location Affected:  scalp  Morphological Description:  isolated patch of scarring with loss of follicular ostia on left frontal scalp, erythematous plaque with mild atrophy and hair loss on central frontal scalp, no lesions on face     Additional History of Present Condition:  patient last seen in office on 6/19/2024. She presents today for follow up. Patient received kenalog injections at last visit and notes she doesn't know if there is any change. She is unaware if there are any new lesions today.    Previous office note:   Patient reports the Plaquenil helped stop her hair loss when she was on it in the past. She states her hair is shedding.  She denies smoking. She has been seeing ophthalmology every year for monitoring for retinol toxicity while on Plaquenil. Currently back on plaquenil but has not completed labs ordered at visit in June. She presents today for recheck of the lesion on the central frontal scalp from last visit    Assessment and Plan:  Based on a thorough discussion of this condition and the management approach to it (including a comprehensive discussion of the known risks, side effects and potential benefits of treatment), the patient (family) agrees to implement the following specific plan:  Kenalog injection not done today. Will hold off due to atrophy of previous injection location.   Continue Plaquenil  200 mg alternating every other day with 400 mg  CBC and CMP every 3-6 months- labs previously ordered. Patient aware to complete labs as she has already completed 1 month of plaquenil therpy  Continue to see ophthalmology yearly for monitoring for retinal toxicity.   Follow up in 4-6 weeks to recheck lesion on scalp to ensure hair regrowth     Scribe Attestation      I,:  Landy Chatterjee MA am acting as a scribe while in the presence of the attending physician.:       I,:  Karma Griffith PA-C personally performed the services described in this documentation    as scribed in my presence.:

## 2024-08-20 DIAGNOSIS — Z79.899 HIGH RISK MEDICATION USE: Primary | ICD-10-CM

## 2024-08-24 ENCOUNTER — APPOINTMENT (OUTPATIENT)
Dept: LAB | Facility: CLINIC | Age: 64
End: 2024-08-24
Payer: COMMERCIAL

## 2024-08-24 DIAGNOSIS — L93.0 DISCOID LUPUS: ICD-10-CM

## 2024-08-24 DIAGNOSIS — Z79.899 HIGH RISK MEDICATION USE: ICD-10-CM

## 2024-08-24 LAB
ALBUMIN SERPL BCG-MCNC: 4.2 G/DL (ref 3.5–5)
ALP SERPL-CCNC: 71 U/L (ref 34–104)
ALT SERPL W P-5'-P-CCNC: 16 U/L (ref 7–52)
ANION GAP SERPL CALCULATED.3IONS-SCNC: 8 MMOL/L (ref 4–13)
AST SERPL W P-5'-P-CCNC: 21 U/L (ref 13–39)
BASOPHILS # BLD AUTO: 0.04 THOUSANDS/ÂΜL (ref 0–0.1)
BASOPHILS NFR BLD AUTO: 1 % (ref 0–1)
BILIRUB SERPL-MCNC: 0.43 MG/DL (ref 0.2–1)
BUN SERPL-MCNC: 14 MG/DL (ref 5–25)
CALCIUM SERPL-MCNC: 9 MG/DL (ref 8.4–10.2)
CHLORIDE SERPL-SCNC: 105 MMOL/L (ref 96–108)
CO2 SERPL-SCNC: 27 MMOL/L (ref 21–32)
CREAT SERPL-MCNC: 0.78 MG/DL (ref 0.6–1.3)
EOSINOPHIL # BLD AUTO: 0.12 THOUSAND/ÂΜL (ref 0–0.61)
EOSINOPHIL NFR BLD AUTO: 4 % (ref 0–6)
ERYTHROCYTE [DISTWIDTH] IN BLOOD BY AUTOMATED COUNT: 14.3 % (ref 11.6–15.1)
GFR SERPL CREATININE-BSD FRML MDRD: 81 ML/MIN/1.73SQ M
GLUCOSE P FAST SERPL-MCNC: 90 MG/DL (ref 65–99)
HCT VFR BLD AUTO: 42.3 % (ref 34.8–46.1)
HGB BLD-MCNC: 14.2 G/DL (ref 11.5–15.4)
IMM GRANULOCYTES # BLD AUTO: 0.01 THOUSAND/UL (ref 0–0.2)
IMM GRANULOCYTES NFR BLD AUTO: 0 % (ref 0–2)
LYMPHOCYTES # BLD AUTO: 1.19 THOUSANDS/ÂΜL (ref 0.6–4.47)
LYMPHOCYTES NFR BLD AUTO: 35 % (ref 14–44)
MCH RBC QN AUTO: 30.2 PG (ref 26.8–34.3)
MCHC RBC AUTO-ENTMCNC: 33.6 G/DL (ref 31.4–37.4)
MCV RBC AUTO: 90 FL (ref 82–98)
MONOCYTES # BLD AUTO: 0.46 THOUSAND/ÂΜL (ref 0.17–1.22)
MONOCYTES NFR BLD AUTO: 14 % (ref 4–12)
NEUTROPHILS # BLD AUTO: 1.58 THOUSANDS/ÂΜL (ref 1.85–7.62)
NEUTS SEG NFR BLD AUTO: 46 % (ref 43–75)
NRBC BLD AUTO-RTO: 0 /100 WBCS
PLATELET # BLD AUTO: 160 THOUSANDS/UL (ref 149–390)
PMV BLD AUTO: 12.7 FL (ref 8.9–12.7)
POTASSIUM SERPL-SCNC: 4 MMOL/L (ref 3.5–5.3)
PROT SERPL-MCNC: 7 G/DL (ref 6.4–8.4)
RBC # BLD AUTO: 4.7 MILLION/UL (ref 3.81–5.12)
SODIUM SERPL-SCNC: 140 MMOL/L (ref 135–147)
WBC # BLD AUTO: 3.4 THOUSAND/UL (ref 4.31–10.16)

## 2024-08-29 DIAGNOSIS — L93.0 DISCOID LUPUS: ICD-10-CM

## 2024-08-29 DIAGNOSIS — Z79.899 HIGH RISK MEDICATION USE: Primary | ICD-10-CM

## 2024-08-31 ENCOUNTER — APPOINTMENT (OUTPATIENT)
Dept: LAB | Facility: CLINIC | Age: 64
End: 2024-08-31
Payer: COMMERCIAL

## 2024-08-31 DIAGNOSIS — L93.0 DISCOID LUPUS: ICD-10-CM

## 2024-08-31 DIAGNOSIS — Z79.899 HIGH RISK MEDICATION USE: ICD-10-CM

## 2024-08-31 LAB
ALBUMIN SERPL BCG-MCNC: 4.1 G/DL (ref 3.5–5)
ALP SERPL-CCNC: 68 U/L (ref 34–104)
ALT SERPL W P-5'-P-CCNC: 16 U/L (ref 7–52)
ANION GAP SERPL CALCULATED.3IONS-SCNC: 7 MMOL/L (ref 4–13)
AST SERPL W P-5'-P-CCNC: 22 U/L (ref 13–39)
BASOPHILS # BLD AUTO: 0.04 THOUSANDS/ÂΜL (ref 0–0.1)
BASOPHILS NFR BLD AUTO: 1 % (ref 0–1)
BILIRUB SERPL-MCNC: 0.5 MG/DL (ref 0.2–1)
BUN SERPL-MCNC: 17 MG/DL (ref 5–25)
CALCIUM SERPL-MCNC: 9.1 MG/DL (ref 8.4–10.2)
CHLORIDE SERPL-SCNC: 106 MMOL/L (ref 96–108)
CO2 SERPL-SCNC: 28 MMOL/L (ref 21–32)
CREAT SERPL-MCNC: 0.79 MG/DL (ref 0.6–1.3)
EOSINOPHIL # BLD AUTO: 0.15 THOUSAND/ÂΜL (ref 0–0.61)
EOSINOPHIL NFR BLD AUTO: 4 % (ref 0–6)
ERYTHROCYTE [DISTWIDTH] IN BLOOD BY AUTOMATED COUNT: 14.2 % (ref 11.6–15.1)
GFR SERPL CREATININE-BSD FRML MDRD: 80 ML/MIN/1.73SQ M
GLUCOSE P FAST SERPL-MCNC: 94 MG/DL (ref 65–99)
HCT VFR BLD AUTO: 43.2 % (ref 34.8–46.1)
HGB BLD-MCNC: 13.9 G/DL (ref 11.5–15.4)
IMM GRANULOCYTES # BLD AUTO: 0.01 THOUSAND/UL (ref 0–0.2)
IMM GRANULOCYTES NFR BLD AUTO: 0 % (ref 0–2)
LYMPHOCYTES # BLD AUTO: 1.5 THOUSANDS/ÂΜL (ref 0.6–4.47)
LYMPHOCYTES NFR BLD AUTO: 39 % (ref 14–44)
MCH RBC QN AUTO: 29.3 PG (ref 26.8–34.3)
MCHC RBC AUTO-ENTMCNC: 32.2 G/DL (ref 31.4–37.4)
MCV RBC AUTO: 91 FL (ref 82–98)
MONOCYTES # BLD AUTO: 0.5 THOUSAND/ÂΜL (ref 0.17–1.22)
MONOCYTES NFR BLD AUTO: 13 % (ref 4–12)
NEUTROPHILS # BLD AUTO: 1.7 THOUSANDS/ÂΜL (ref 1.85–7.62)
NEUTS SEG NFR BLD AUTO: 43 % (ref 43–75)
NRBC BLD AUTO-RTO: 0 /100 WBCS
PLATELET # BLD AUTO: 162 THOUSANDS/UL (ref 149–390)
PMV BLD AUTO: 12.9 FL (ref 8.9–12.7)
POTASSIUM SERPL-SCNC: 4.2 MMOL/L (ref 3.5–5.3)
PROT SERPL-MCNC: 6.6 G/DL (ref 6.4–8.4)
RBC # BLD AUTO: 4.74 MILLION/UL (ref 3.81–5.12)
SODIUM SERPL-SCNC: 141 MMOL/L (ref 135–147)
WBC # BLD AUTO: 3.9 THOUSAND/UL (ref 4.31–10.16)

## 2024-08-31 PROCEDURE — 36415 COLL VENOUS BLD VENIPUNCTURE: CPT

## 2024-08-31 PROCEDURE — 85025 COMPLETE CBC W/AUTO DIFF WBC: CPT

## 2024-08-31 PROCEDURE — 80053 COMPREHEN METABOLIC PANEL: CPT

## 2024-09-12 ENCOUNTER — OFFICE VISIT (OUTPATIENT)
Dept: DERMATOLOGY | Facility: CLINIC | Age: 64
End: 2024-09-12
Payer: COMMERCIAL

## 2024-09-12 VITALS — BODY MASS INDEX: 29.94 KG/M2 | WEIGHT: 153.3 LBS | TEMPERATURE: 97.6 F

## 2024-09-12 DIAGNOSIS — L93.0 DISCOID LUPUS: Primary | ICD-10-CM

## 2024-09-12 PROCEDURE — 99214 OFFICE O/P EST MOD 30 MIN: CPT

## 2024-09-12 RX ORDER — TACROLIMUS 1 MG/G
OINTMENT TOPICAL
Qty: 30 G | Refills: 3 | Status: SHIPPED | OUTPATIENT
Start: 2024-09-12

## 2024-09-12 RX ORDER — CLOBETASOL PROPIONATE 0.5 MG/ML
SOLUTION TOPICAL
Qty: 50 ML | Refills: 0 | Status: SHIPPED | OUTPATIENT
Start: 2024-09-12

## 2024-09-12 NOTE — PROGRESS NOTES
"[facility] Dermatology Clinic Note     Patient Name: Keri Garcia  Encounter Date: 9/12/24     Have you been cared for by a [facility] Dermatologist in the last 3 years and, if so, which description applies to you?    Yes.  I have been here within the last 3 years, and my medical history has NOT changed since that time.  I am FEMALE/of child-bearing potential.    REVIEW OF SYSTEMS:  Have you recently had or currently have any of the following? No changes in my recent health.   PAST MEDICAL HISTORY:  Have you personally ever had or currently have any of the following?  If \"YES,\" then please provide more detail. No changes in my medical history.   HISTORY OF IMMUNOSUPPRESSION: Do you have a history of any of the following:  Systemic Immunosuppression such as Diabetes, Biologic or Immunotherapy, Chemotherapy, Organ Transplantation, Bone Marrow Transplantation or Prednisone?  YES, Enbrel     Answering \"YES\" requires the addition of the dotphrase \"IMMUNOSUPPRESSED\" as the first diagnosis of the patient's visit.   FAMILY HISTORY:  Any \"first degree relatives\" (parent, brother, sister, or child) with the following?    No changes in my family's known health.   PATIENT EXPERIENCE:    Do you want the Dermatologist to perform a COMPLETE skin exam today including a clinical examination under the \"bra and underwear\" areas?  NO  If necessary, do we have your permission to call and leave a detailed message on your Preferred Phone number that includes your specific medical information?  Yes      Allergies   Allergen Reactions    Gluten Meal - Food Allergy GI Intolerance    Penicillins Hives     Other reaction(s): Hives        Current Outpatient Medications:     atorvastatin (LIPITOR) 10 mg tablet, ONE AT DINNERTIME MEAL FOR LIPIDS, Disp: , Rfl:     calcium carbonate (OS-RJ) 1250 (500 Ca) MG tablet, NO SIG SUPPLIED, Disp: , Rfl:     Enbrel SureClick 50 MG/ML injection, , Disp: , Rfl:     folic acid (FOLVITE) 1 mg tablet, Take by " mouth, Disp: , Rfl:     hydroxychloroquine (PLAQUENIL) 200 mg tablet, Alternate one pill (200 mg) and two pills (400 mg); example schedule M - 1 pill, T - 2 pills, W - 1 pill, Th - 2 pills , etc., Disp: 135 tablet, Rfl: 1    meloxicam (MOBIC) 15 mg tablet, 15 mg if needed, Disp: , Rfl:     methotrexate 2.5 mg tablet, 2.5 mg 3 pills once weekly, Disp: , Rfl:           Whom besides the patient is providing clinical information about today's encounter?   NO ADDITIONAL HISTORIAN (patient alone provided history)    Physical Exam and Assessment/Plan by Diagnosis:    DISCOID LUPUS WITH HX OF RA   Physical Exam:  Anatomic Location Affected:  scalp  Morphological Description:  isolated patch of scarring with loss of follicular ostia on left frontal scalp, previously treated plaque with resolved atrophy, perifollicular scaling/follicular plugging and hair loss on central frontal scalp, no erythema, no lesions on face      Additional History of Present Condition:  Patient last seen in office on 8/7/2024 for recheck of previously treated active lesion on the central frontal scalp. Kenalog injection deferred at that time due to atrophy from prior injection. She presents today for follow up for recheck of the area. She is unaware if there are any new lesions today. Patient maybe notes some tenderness in the area. Patient stopped Plaquenil two weeks ago due abnormal WBC count, and absolute neutrophils. Labs initially done on 8/24/2024 and repeated after discontinuing Plaquenil on 8/31/2024. Abnormalities remained. She has been seeing ophthalmology every year for monitoring for retinol toxicity while on Plaquenil. Patient additionally follows with rheumatology for her rheumatoid arthritis and is currently on oral methotrexate 7.5mg once a week, and Embrel every week.    Assessment and Plan:  Based on a thorough discussion of this condition and the management approach to it (including a comprehensive discussion of the known risks,  side effects and potential benefits of treatment), the patient (family) agrees to implement the following specific plan:  Discussed abnormal CBC may be due to natural process of discoid lupus and rheumatoid arthritis. Patient is currently on 7.5 mg of methotrexate every week. Prior labs showed fluctuations in WBC count. Therefore, will continue to monitor labs monthly while on Plaquenil. Okay to continue Plaquenil at this time. ASAP Hematology referral not indicated at this time.   Continue Plaquenil 200 mg alternating every other day with 400 mg  CBC and CMP every month- labs ordered for monitoring.   Continue to see ophthalmology yearly for monitoring for retinal toxicity.   Follow up in 3 months to recheck lesion on scalp to ensure hair regrowth. Patient will call office in two weeks to set up an appointment to follow with a physician.    Start Clobetasol 0.05% scalp solution- apply to affected areas of scalp twice a day ONLY 2 days a week.   Start Tacrolimus 0.1% ointment- apply to scalp twice a day Monday through Friday.   Discussed use of topical minoxidil 5% solution-apply to affected areas of scalp twice a day.     Scribe Attestation      I,:  Kavita Tapia am acting as a scribe while in the presence of the attending physician.:       I,:  Jasmin Kent PA-C personally performed the services described in this documentation    as scribed in my presence.:

## 2024-10-01 ENCOUNTER — TELEPHONE (OUTPATIENT)
Age: 64
End: 2024-10-01

## 2024-10-01 NOTE — TELEPHONE ENCOUNTER
Pt calling to schedule her 2 mo follow up with Dr RAMIREZ per Dr Sousa/Jasmin's note 9/11/24    Per [place], scheduled pt onto Dr RAMIREZ's schedule, Nov 11 at 310pm at , pt agreed

## 2024-11-11 ENCOUNTER — OFFICE VISIT (OUTPATIENT)
Dept: DERMATOLOGY | Facility: CLINIC | Age: 64
End: 2024-11-11
Payer: COMMERCIAL

## 2024-11-11 VITALS — WEIGHT: 134 LBS | BODY MASS INDEX: 26.17 KG/M2 | TEMPERATURE: 98.5 F

## 2024-11-11 DIAGNOSIS — L93.0 DISCOID LUPUS: Primary | ICD-10-CM

## 2024-11-11 PROCEDURE — 99213 OFFICE O/P EST LOW 20 MIN: CPT | Performed by: REGISTERED NURSE

## 2024-11-11 NOTE — PROGRESS NOTES
"[facility] Dermatology Clinic Note     Patient Name: Keri Garcia  Encounter Date: 11/11/24     Have you been cared for by a [facility] Dermatologist in the last 3 years and, if so, which description applies to you?    Yes.  I have been here within the last 3 years, and my medical history has NOT changed since that time.  I am FEMALE/of child-bearing potential.    REVIEW OF SYSTEMS:  Have you recently had or currently have any of the following? No changes in my recent health.   PAST MEDICAL HISTORY:  Have you personally ever had or currently have any of the following?  If \"YES,\" then please provide more detail. No changes in my medical history.   HISTORY OF IMMUNOSUPPRESSION: Do you have a history of any of the following:  Systemic Immunosuppression such as Diabetes, Biologic or Immunotherapy, Chemotherapy, Organ Transplantation, Bone Marrow Transplantation or Prednisone?  YES, Embrel     Answering \"YES\" requires the addition of the dotphrase \"IMMUNOSUPPRESSED\" as the first diagnosis of the patient's visit.   FAMILY HISTORY:  Any \"first degree relatives\" (parent, brother, sister, or child) with the following?    No changes in my family's known health.   PATIENT EXPERIENCE:    Do you want the Dermatologist to perform a COMPLETE skin exam today including a clinical examination under the \"bra and underwear\" areas?  NO  If necessary, do we have your permission to call and leave a detailed message on your Preferred Phone number that includes your specific medical information?  Yes      Allergies   Allergen Reactions    Gluten Meal - Food Allergy GI Intolerance    Penicillins Hives     Other reaction(s): Hives        Current Outpatient Medications:     atorvastatin (LIPITOR) 10 mg tablet, ONE AT DINNERTIME MEAL FOR LIPIDS, Disp: , Rfl:     calcium carbonate (OS-RJ) 1250 (500 Ca) MG tablet, NO SIG SUPPLIED, Disp: , Rfl:     clobetasol (TEMOVATE) 0.05 % external solution, Apply to affected areas of scalp twice a day two " days a week only., Disp: 50 mL, Rfl: 0    Enbrel SureClick 50 MG/ML injection, , Disp: , Rfl:     folic acid (FOLVITE) 1 mg tablet, Take by mouth, Disp: , Rfl:     hydroxychloroquine (PLAQUENIL) 200 mg tablet, Alternate one pill (200 mg) and two pills (400 mg); example schedule M - 1 pill, T - 2 pills, W - 1 pill, Th - 2 pills , etc., Disp: 135 tablet, Rfl: 1    meloxicam (MOBIC) 15 mg tablet, 15 mg if needed, Disp: , Rfl:     methotrexate 2.5 mg tablet, 2.5 mg 3 pills once weekly, Disp: , Rfl:     tacrolimus (PROTOPIC) 0.1 % ointment, Apply to affected areas of scalp twice a day Monday to Friday., Disp: 30 g, Rfl: 3          Whom besides the patient is providing clinical information about today's encounter?   NO ADDITIONAL HISTORIAN (patient alone provided history)    Physical Exam and Assessment/Plan by Diagnosis:    DISCOID LUPUS WITH HX OF RA     Physical Exam:  Anatomic Location Affected:  scalp  Morphological Description:  isolated patch of scarring with loss of follicular ostia on left frontal scalp, previously treated plaque with resolved atrophy, perifollicular scaling/follicular plugging and hair loss on central frontal scalp, no erythema, no lesions on face      Additional History of Present Condition:  Patient last seen in office on 9/12/2024 for recheck of previously treated active lesion on the central frontal scalp. Patient is currently taking Plaquenil 200 mg daily and alternating every other day with 400 mg. Her CBC and CMP labs were last completed 10/5/24 and are wnl. She is not using clobetasol scalp solution, tacrolimus ointment, and minoxidil solution. She reports some occasional sensitivity of the areas, but she is unsure if any new lesions have appeared. She last saw the ophthalmologist on 4/16/24. She has been seeing ophthalmology every year for monitoring for retinol toxicity while on Plaquenil. Patient additionally follows with rheumatology for her rheumatoid arthritis and is currently on  oral methotrexate 7.5mg once a week, and Embrel every week.     Assessment and Plan:  Based on a thorough discussion of this condition and the management approach to it (including a comprehensive discussion of the known risks, side effects and potential benefits of treatment), the patient (family) agrees to implement the following specific plan:  Discussed abnormal CBC is likely due to methotrexate. Patient is currently on 7.5 mg of methotrexate every week. Advised to keep up with her folic acid regimen. Abnormal CBC is likely not due to plaquenil.   Continue Plaquenil 200 mg alternating every other day with 400 mg  Continue to see ophthalmology yearly for monitoring for retinal toxicity.   Side effects of plaquenil previously reviewed   Can use over the counter Rogaine 5% twice daily.  Follow up in 6 months to recheck lesion on scalp.  Continue to follow up with rheumatology       Scribe Attestation      I,:  Lizbet Harvey MA am acting as a scribe while in the presence of the attending physician.:       I,:  Angel Luis Mcdermott MD personally performed the services described in this documentation    as scribed in my presence.:

## 2024-12-28 ENCOUNTER — HOSPITAL ENCOUNTER (OUTPATIENT)
Dept: HOSPITAL 99 - WDC | Age: 64
End: 2024-12-28
Payer: COMMERCIAL

## 2024-12-28 DIAGNOSIS — Z12.31: Primary | ICD-10-CM

## 2024-12-28 DIAGNOSIS — Z01.419: ICD-10-CM

## 2025-03-18 DIAGNOSIS — L93.0 DISCOID LUPUS: ICD-10-CM

## 2025-03-18 RX ORDER — HYDROXYCHLOROQUINE SULFATE 200 MG/1
TABLET, FILM COATED ORAL
Qty: 135 TABLET | Refills: 0 | Status: SHIPPED | OUTPATIENT
Start: 2025-03-18 | End: 2025-03-21 | Stop reason: SDUPTHER

## 2025-03-20 ENCOUNTER — TELEPHONE (OUTPATIENT)
Dept: DERMATOLOGY | Facility: CLINIC | Age: 65
End: 2025-03-20

## 2025-03-20 NOTE — TELEPHONE ENCOUNTER
Received [employer] fax from [employer] for Medication Clarification on HYDROXYCHLOROQUINE TABS 200MG . Please complete form, sign, date and Fax back to [phone] Form scanned into [employer] for review.

## 2025-03-21 DIAGNOSIS — L93.0 DISCOID LUPUS: ICD-10-CM

## 2025-03-21 RX ORDER — HYDROXYCHLOROQUINE SULFATE 200 MG/1
TABLET, FILM COATED ORAL
Qty: 135 TABLET | Refills: 0 | Status: SHIPPED | OUTPATIENT
Start: 2025-03-21 | End: 2025-06-17

## 2025-04-23 ENCOUNTER — HOSPITAL ENCOUNTER (OUTPATIENT)
Dept: HOSPITAL 99 - RAD | Age: 65
End: 2025-04-23
Payer: COMMERCIAL

## 2025-04-23 DIAGNOSIS — Z78.0: ICD-10-CM

## 2025-04-23 DIAGNOSIS — Z01.419: Primary | ICD-10-CM

## 2025-05-19 ENCOUNTER — OFFICE VISIT (OUTPATIENT)
Dept: DERMATOLOGY | Facility: CLINIC | Age: 65
End: 2025-05-19
Payer: COMMERCIAL

## 2025-05-19 VITALS — WEIGHT: 117 LBS | TEMPERATURE: 97.9 F | BODY MASS INDEX: 22.85 KG/M2

## 2025-05-19 DIAGNOSIS — L93.0 DISCOID LUPUS: Primary | ICD-10-CM

## 2025-05-19 PROCEDURE — 99213 OFFICE O/P EST LOW 20 MIN: CPT | Performed by: REGISTERED NURSE

## 2025-05-19 RX ORDER — HYDROXYCHLOROQUINE SULFATE 200 MG/1
TABLET, FILM COATED ORAL
Qty: 90 TABLET | Refills: 2 | Status: SHIPPED | OUTPATIENT
Start: 2025-05-19 | End: 2025-08-17

## 2025-05-19 NOTE — PROGRESS NOTES
"[facility] Dermatology Clinic Note     Patient Name: Keri Garcia  Encounter Date: 5/19/25       Have you been cared for by a [facility] Dermatologist in the last 3 years and, if so, which description applies to you? Yes. I have been here within the last 3 years, and my medical history has NOT changed since that time. I am of child-bearing potential.     REVIEW OF SYSTEMS:  Have you recently had or currently have any of the following? No changes in my recent health.   PAST MEDICAL HISTORY:  Have you personally ever had or currently have any of the following?  If \"YES,\" then please provide more detail. No changes in my medical history.   HISTORY OF IMMUNOSUPPRESSION: Do you have a history of any of the following:  Systemic Immunosuppression such as Diabetes, Biologic or Immunotherapy, Chemotherapy, Organ Transplantation, Bone Marrow Transplantation or Prednisone?  YES, Embrel     Answering \"YES\" requires the addition of the dotphrase \"IMMUNOSUPPRESSED\" as the first diagnosis of the patient's visit.   FAMILY HISTORY:  Any \"first degree relatives\" (parent, brother, sister, or child) with the following?    No changes in my family's known health.   PATIENT EXPERIENCE:    Do you want the Dermatologist to perform a COMPLETE skin exam today including a clinical examination under the \"bra and underwear\" areas?  NO  If necessary, do we have your permission to call and leave a detailed message on your Preferred Phone number that includes your specific medical information?  Yes      Allergies[1] Current Medications[2]        Whom besides the patient is providing clinical information about today's encounter?   NO ADDITIONAL HISTORIAN (patient alone provided history)    Physical Exam and Assessment/Plan by Diagnosis:    DISCOID LUPUS WITH HX OF RA      Physical Exam:  Anatomic Location Affected:  scalp  Morphological Description:   isolated patch of scarring with loss of follicular ostia on left frontal scalp, perifollicular " scaling/follicular plugging and hair loss on central frontal scalp     Additional History of Present Condition:  Patient presents as a follow up for for recheck of previously treated active lesion on the central frontal scalp due to discoid lupus. She is currently on 7.5 mg of methotrexate weekly, folic acid supplements, alternating between plaquenil 200 mg and 400 mg every other day. She saw opthalmology and rheumatology back in February.      Assessment and Plan:  Based on a thorough discussion of this condition and the management approach to it (including a comprehensive discussion of the known risks, side effects and potential benefits of treatment), the patient (family) agrees to implement the following specific plan:  CONTINUE Plaquenil 200 mg alternating every other day with 400 mg  Continue to see ophthalmology yearly for monitoring for retinal toxicity.   Side effects of plaquenil previously reviewed   Can use over the counter Rogaine 5% twice daily.  Follow up in 9 months to recheck lesion on scalp.  CONTINUE 7.5 mg of methotrexate every week. Advised to keep up with her folic acid regimen.  Continue to follow up with rheumatology       Scribe Attestation      I,:  Lizbet Harvey MA am acting as a scribe while in the presence of the attending physician.:       I,:  Angel Luis Mcdermott MD personally performed the services described in this documentation    as scribed in my presence.:                [1]   Allergies  Allergen Reactions    Gluten Meal - Food Allergy GI Intolerance    Penicillins Hives     Other reaction(s): Hives     [2]   Current Outpatient Medications:     atorvastatin (LIPITOR) 10 mg tablet, ONE AT DINNERTIME MEAL FOR LIPIDS, Disp: , Rfl:     calcium carbonate (OS-RJ) 1250 (500 Ca) MG tablet, NO SIG SUPPLIED, Disp: , Rfl:     clobetasol (TEMOVATE) 0.05 % external solution, Apply to affected areas of scalp twice a day two days a week only., Disp: 50 mL, Rfl: 0    Enbrel SureClick 50 MG/ML  injection, , Disp: , Rfl:     folic acid (FOLVITE) 1 mg tablet, Take by mouth, Disp: , Rfl:     hydroxychloroquine (PLAQUENIL) 200 mg tablet, Alternate 200 mg every other day with 400 mg., Disp: 135 tablet, Rfl: 0    meloxicam (MOBIC) 15 mg tablet, 15 mg if needed, Disp: , Rfl:     methotrexate 2.5 mg tablet, 2.5 mg 3 pills once weekly, Disp: , Rfl:     tacrolimus (PROTOPIC) 0.1 % ointment, Apply to affected areas of scalp twice a day Monday to Friday., Disp: 30 g, Rfl: 3